# Patient Record
Sex: MALE | Race: BLACK OR AFRICAN AMERICAN | NOT HISPANIC OR LATINO | URBAN - METROPOLITAN AREA
[De-identification: names, ages, dates, MRNs, and addresses within clinical notes are randomized per-mention and may not be internally consistent; named-entity substitution may affect disease eponyms.]

---
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
## 2023-02-19 ENCOUNTER — HOSPITAL ENCOUNTER (EMERGENCY)
Facility: HOSPITAL | Age: 23
Discharge: HOME | End: 2023-02-19
Attending: EMERGENCY MEDICINE
Payer: MEDICAID

## 2023-02-19 VITALS
RESPIRATION RATE: 18 BRPM | OXYGEN SATURATION: 98 % | TEMPERATURE: 98 F | WEIGHT: 180 LBS | HEIGHT: 73 IN | HEART RATE: 78 BPM | DIASTOLIC BLOOD PRESSURE: 66 MMHG | BODY MASS INDEX: 23.86 KG/M2 | SYSTOLIC BLOOD PRESSURE: 132 MMHG

## 2023-02-19 DIAGNOSIS — H10.31 ACUTE CONJUNCTIVITIS OF RIGHT EYE, UNSPECIFIED ACUTE CONJUNCTIVITIS TYPE: Primary | ICD-10-CM

## 2023-02-19 PROCEDURE — 99283 EMERGENCY DEPT VISIT LOW MDM: CPT

## 2023-02-19 RX ORDER — GENTAMICIN SULFATE 3 MG/ML
2 SOLUTION/ DROPS OPHTHALMIC EVERY 4 HOURS
Qty: 5 ML | Refills: 0 | Status: SHIPPED | OUTPATIENT
Start: 2023-02-19 | End: 2023-03-12 | Stop reason: ALTCHOICE

## 2023-02-19 NOTE — ED PROVIDER NOTES
Encounter Date: 2/19/2023    SCRIBE #1 NOTE: I, Tami Lim, am scribing for, and in the presence of,  Erwin House MD. I have scribed the entire note.     History     Chief Complaint   Patient presents with    Eye Drainage     Pt c/o redness and irritation to right eye since yesterday. +photophobia.      The patient is a 22 year-old male who presents to the [facility] department for eye drainage that began 2 days ago. The patient reports waking up with irritation, redness, and crusting to his right eye after sleeping wih his contacts. He states similar symptoms happened around this same time last year and was cleared with eye drops. Associated symptoms include photophobia.     The history is provided by the patient.   Review of patient's allergies indicates:  No Known Allergies  No past medical history on file.  No past surgical history on file.  No family history on file.  Social History     Tobacco Use    Smoking status: Never    Smokeless tobacco: Never     Review of Systems   Eyes:  Positive for photophobia, discharge, redness and itching.   All other systems reviewed and are negative.    Physical Exam     Initial Vitals [02/19/23 1624]   BP Pulse Resp Temp SpO2   132/66 78 18 97.8 °F (36.6 °C) 98 %      MAP       --         Physical Exam    Nursing note and vitals reviewed.  Constitutional: No distress.   HENT:   Head: Normocephalic and atraumatic.   Mouth/Throat: Uvula is midline and mucous membranes are normal.   Eyes: Lids are normal. Pupils are equal, round, and reactive to light.   There is conjunctival injection of the right eye.  No visible right eye foreign body.  No palpable right preauricular node.   Neck: Neck supple.   Cardiovascular:  Normal rate.           Pulmonary/Chest: Effort normal and breath sounds normal. No respiratory distress. He has no wheezes. He has no rhonchi.   Abdominal: Abdomen is soft. There is no abdominal tenderness.   Musculoskeletal:         General: Normal range of motion.       Cervical back: Neck supple. No rigidity.     Neurological: He is alert and oriented to person, place, and time.   Skin: Skin is warm and dry. No rash noted.   Psychiatric: Thought content normal.       ED Course   Procedures  Labs Reviewed - No data to display       Imaging Results    None          Medications - No data to display  Medical Decision Making:   Initial Assessment:   22-year-old male with right eye irritation and redness.  Differential Diagnosis:   Corneal abrasion, conjunctivitis, right eye foreign body, iritis.  ED Management:  Patient presents with signs and symptoms of conjunctivitis.  I will place him on gentamicin drops for this.  I have explained that he should start to see resolution within 3-5 days and should follow-up if symptoms persist.        Scribe Attestation:   Scribe #1: I performed the above scribed service and the documentation accurately describes the services I performed. I attest to the accuracy of the note.                   Clinical Impression:   Final diagnoses:  [H10.31] Acute conjunctivitis of right eye, unspecified acute conjunctivitis type (Primary)        ED Disposition Condition    Discharge Stable          ED Prescriptions       Medication Sig Dispense Start Date End Date Auth. Provider    gentamicin (GARAMYCIN) 0.3 % ophthalmic solution Place 2 drops into the right eye every 4 (four) hours. 5 mL 2/19/2023 -- Erwin House MD          Follow-up Information       Follow up With Specialties Details Why Contact [facility] - Emergency Dept Emergency Medicine  If symptoms not resolved in 5 days [address] [facility] [address] [phone]        I, Dr. Erwin House, personally performed the services described in this documentation. All medical record entries made by the scribe were at my direction and in my presence. I have reviewed the chart and agree that the record reflects my personal performance and is accurate and complete.  Erwin House MD.  5:39 PM 02/19/2023       Erwin House MD  02/19/23 1747

## 2023-03-12 ENCOUNTER — HOSPITAL ENCOUNTER (EMERGENCY)
Facility: HOSPITAL | Age: 23
Discharge: HOME | End: 2023-03-12
Attending: EMERGENCY MEDICINE
Payer: MEDICAID

## 2023-03-12 VITALS
TEMPERATURE: 98 F | BODY MASS INDEX: 23.75 KG/M2 | RESPIRATION RATE: 16 BRPM | OXYGEN SATURATION: 97 % | WEIGHT: 180 LBS | DIASTOLIC BLOOD PRESSURE: 65 MMHG | SYSTOLIC BLOOD PRESSURE: 134 MMHG | HEART RATE: 80 BPM

## 2023-03-12 DIAGNOSIS — H10.9 CONJUNCTIVITIS OF LEFT EYE, UNSPECIFIED CONJUNCTIVITIS TYPE: Primary | ICD-10-CM

## 2023-03-12 PROCEDURE — 99283 EMERGENCY DEPT VISIT LOW MDM: CPT

## 2023-03-12 RX ORDER — OFLOXACIN 3 MG/ML
SOLUTION/ DROPS OPHTHALMIC
Qty: 10 ML | Refills: 0 | Status: SHIPPED | OUTPATIENT
Start: 2023-03-12

## 2023-03-12 NOTE — ED TRIAGE NOTES
Pt reports to the ED for left eye redness and itching for 2 days.     Patient identifiers verified and correct.     APPEARANCE: Patient not in acute distress.  NEURO: Awake, alert, appropriate for age, condition, and situation, pupils equal, round, and reactive.   HEENT: Head symmetrical. Eyes bilateral.  Bilateral ears without drainage. Bilateral nares patent, throat clear.  CARDIAC: Regular rate and rhythm  RESPIRATORY: Airway is open and patent. Respirations are normal and spontaneous on room air.   NEUROVASCULAR: All extremities are warm and pink. .  MUSCULOSKELETAL: Moves all extremities.   SKIN: Warm and dry, adequate turgor, mucus membranes moist and pink; no breakdown, lesions, or ecchymosis noted.     Will continue to monitor.

## 2023-03-12 NOTE — ED PROVIDER NOTES
Encounter Date: 3/12/2023       History     Chief Complaint   Patient presents with    Eye Problem     Left eye redness and itching x 2 days       Patient presents with redness and irritation of the left eye for the past 2 days.  He denies any visual abnormalities, denies any photophobia, denies any excessive tearing.  States he does not have a foreign body sensation to the eye.  Denies any fevers/chills and has not been taking any medications to help with the eye.    Review of patient's allergies indicates:  No Known Allergies  No past medical history on file.  No past surgical history on file.  No family history on file.  Social History     Tobacco Use    Smoking status: Never    Smokeless tobacco: Never     Review of Systems   Eyes:  Positive for redness.     Physical Exam     Initial Vitals [03/12/23 1151]   BP Pulse Resp Temp SpO2   134/65 80 16 98.1 °F (36.7 °C) 97 %      MAP       --         Physical Exam    Nursing note and vitals reviewed.  Constitutional: He appears well-developed and well-nourished.   Eyes: EOM are normal. Pupils are equal, round, and reactive to light.   Left eye conjunctiva injected consistent with conjunctivitis   Cardiovascular:  Normal rate and regular rhythm.           Pulmonary/Chest: Breath sounds normal.   Musculoskeletal:         General: Normal range of motion.     Neurological: He is alert and oriented to person, place, and time.   Skin: Skin is warm and dry. Capillary refill takes less than 2 seconds.   Psychiatric: He has a normal mood and affect.       ED Course   Procedures  Labs Reviewed - No data to display       Imaging Results    None          Medications - No data to display  Medical Decision Making:   ED Management:  Instructed patient to return immediately for any new or worsening symptoms and he verbalized understanding.                        Clinical Impression:   Final diagnoses:  [H10.9] Conjunctivitis of left eye, unspecified conjunctivitis type (Primary)         ED Disposition Condition    Discharge Stable          ED Prescriptions       Medication Sig Dispense Start Date End Date Auth. Provider    ofloxacin (OCUFLOX) 0.3 % ophthalmic solution 2 drops to left eye every 2-4 hours while awake for 2 days THEN 4 times daily for 5 days 10 mL 3/12/2023 -- Nathanael Quiroz DO          Follow-up Information       Follow up With Specialties Details Why Contact Info    [facility] - Emergency Dept Emergency Medicine  As needed [address] [facility] [address] [phone]             Nathanael Quiroz DO  03/12/23 2003

## 2023-03-22 ENCOUNTER — HOSPITAL ENCOUNTER (EMERGENCY)
Facility: HOSPITAL | Age: 23
Discharge: HOME | End: 2023-03-22
Attending: STUDENT IN AN ORGANIZED HEALTH CARE EDUCATION/TRAINING PROGRAM
Payer: MEDICAID

## 2023-03-22 VITALS
SYSTOLIC BLOOD PRESSURE: 140 MMHG | HEART RATE: 70 BPM | TEMPERATURE: 98 F | BODY MASS INDEX: 25.23 KG/M2 | OXYGEN SATURATION: 99 % | DIASTOLIC BLOOD PRESSURE: 70 MMHG | WEIGHT: 191.25 LBS | RESPIRATION RATE: 16 BRPM

## 2023-03-22 DIAGNOSIS — T15.01XA FOREIGN BODY OF RIGHT CORNEA, INITIAL ENCOUNTER: Primary | ICD-10-CM

## 2023-03-22 PROCEDURE — 99283 EMERGENCY DEPT VISIT LOW MDM: CPT

## 2023-03-22 PROCEDURE — 25000003 PHARM REV CODE 250: Performed by: STUDENT IN AN ORGANIZED HEALTH CARE EDUCATION/TRAINING PROGRAM

## 2023-03-22 RX ORDER — CIPROFLOXACIN HYDROCHLORIDE 3 MG/ML
1 SOLUTION/ DROPS OPHTHALMIC ONCE
Status: COMPLETED | OUTPATIENT
Start: 2023-03-22 | End: 2023-03-22

## 2023-03-22 RX ORDER — PREDNISOLONE ACETATE 10 MG/ML
1 SUSPENSION/ DROPS OPHTHALMIC ONCE
Status: COMPLETED | OUTPATIENT
Start: 2023-03-22 | End: 2023-03-22

## 2023-03-22 RX ORDER — KETOROLAC TROMETHAMINE 5 MG/ML
1 SOLUTION OPHTHALMIC ONCE
Status: COMPLETED | OUTPATIENT
Start: 2023-03-22 | End: 2023-03-22

## 2023-03-22 RX ADMIN — KETOROLAC TROMETHAMINE 1 DROP: 5 SOLUTION/ DROPS OPHTHALMIC at 10:03

## 2023-03-22 RX ADMIN — FLUORESCEIN SODIUM 1 EACH: 1 STRIP OPHTHALMIC at 07:03

## 2023-03-22 RX ADMIN — PREDNISOLONE ACETATE 1 DROP: 10 SUSPENSION/ DROPS OPHTHALMIC at 10:03

## 2023-03-22 RX ADMIN — CIPROFLOXACIN 1 DROP: 3 SOLUTION OPHTHALMIC at 10:03

## 2023-03-23 NOTE — DISCHARGE INSTRUCTIONS

## 2023-03-23 NOTE — ED NOTES
Patient arrived to ED with complaints of R eye irritating and burning. Reports that he woke up and his eye was hurting. Denies vision changes. No drainage. Eye watering. Patient works in construction.     APPEARANCE: Alert, oriented and in no acute distress.  CARDIAC: Normal rate and rhythm, no murmur heard.   PERIPHERAL VASCULAR: peripheral pulses present. Normal cap refill. No edema. Warm to touch.    RESPIRATORY:Normal rate and effort, breath sounds clear bilaterally throughout chest. Respirations are equal and unlabored no obvious signs of distress.  GASTRO: soft, bowel sounds normal, no tenderness, no abdominal distention.  MUSC: Full ROM. No bony tenderness or soft tissue tenderness. No obvious deformity.  SKIN: Skin is warm and dry, normal skin turgor, mucous membranes moist.  NEURO: 5/5 strength major flexors/extensors bilaterally. Sensory intact to light touch bilaterally. Leyda coma scale: eyes open spontaneously-4, oriented & converses-5, obeys commands-6. No neurological abnormalities.   MENTAL STATUS: awake, alert and aware of environment.  EYE: PERRL, both eyes: pupils brisk and reactive to light. Normal size.  ENT: EARS: no obvious drainage. R eye sclera redness. Irritating. Burning. NOSE: no active bleeding.

## 2023-03-23 NOTE — ED PROVIDER NOTES
Encounter Date: 3/22/2023       History     Chief Complaint   Patient presents with    Eye Pain     Right eye irritation that started this morning. Right eye is red and watering. Denies change in vision. Patient normally wears contacts. Not currently in.      22 year old male contact wearer presents with right eye pain since this AM. Says he woke up with discomfort, he placed his contacts in and then quickly removed them. 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 He does not recall something flying into his eyes. No drainage. The eye is red and painful. Vision intact.    Review of patient's allergies indicates:  No Known Allergies  No past medical history on file.  No past surgical history on file.  No family history on file.  Social History     Tobacco Use    Smoking status: Never    Smokeless tobacco: Never     Review of Systems   All other systems reviewed and are negative.    Physical Exam     Initial Vitals [03/22/23 1919]   BP Pulse Resp Temp SpO2   (!) 142/77 82 17 98.3 °F (36.8 °C) 99 %      MAP       --         Physical Exam    Vitals reviewed.  Constitutional: He appears well-developed and well-nourished.   HENT:   Head: Normocephalic and atraumatic.   Eyes: EOM are normal. Pupils are equal, round, and reactive to light.   R eye: ciliary flush. Tearing. No purulent drainage. Fluoroscein examination showing a possible FB to the right lower quadrant of the iris. There is point-targeted uptake at this spot.     With lights on, there is a suspected FB.    L eye: normal   Neck: Neck supple. No JVD present.   Normal range of motion.  Cardiovascular:  Normal rate, regular rhythm, normal heart sounds and intact distal pulses.     Exam reveals no gallop and no friction rub.       No murmur heard.  Pulmonary/Chest: Breath sounds normal. No stridor. No respiratory distress.   Abdominal: Abdomen is soft. There is no abdominal tenderness.   Musculoskeletal:         General: No edema. Normal range of  motion.      Cervical back: Normal range of motion and neck supple.     Neurological: He is alert and oriented to person, place, and time. GCS score is 15. GCS eye subscore is 4. GCS verbal subscore is 5. GCS motor subscore is 6.   Skin: Skin is warm and dry. Capillary refill takes less than 2 seconds.   Psychiatric: He has a normal mood and affect. Thought content normal.       ED Course   Procedures  Labs Reviewed - No data to display       Imaging Results    None          Medications   ciprofloxacin HCl 0.3 % ophthalmic solution 1 drop (has no administration in time range)   ketorolac 0.5% ophthalmic solution 1 drop (has no administration in time range)   prednisoLONE acetate 1 % ophthalmic suspension 1 drop (has no administration in time range)   fluorescein ophthalmic strip 1 each (1 each Both Eyes Given by Provider 3/22/23 1957)     Medical Decision Making:   Initial Assessment:   As above. R eye was anesthetized with propacaine with good effect. FB removed with 25g needle. Pt tolerated well. Will start on topical abx, prednisolone. Will refer to ophthalmology.                         Clinical Impression:   Final diagnoses:  [T15.01XA] Foreign body of right cornea, initial encounter (Primary)               Meng Lo MD  03/22/23 2013